# Patient Record
Sex: MALE | Race: WHITE
[De-identification: names, ages, dates, MRNs, and addresses within clinical notes are randomized per-mention and may not be internally consistent; named-entity substitution may affect disease eponyms.]

---

## 2022-11-16 ENCOUNTER — HOSPITAL ENCOUNTER (OUTPATIENT)
Dept: HOSPITAL 47 - RADNMMAIN | Age: 67
Discharge: HOME | End: 2022-11-16
Attending: FAMILY MEDICINE
Payer: MEDICARE

## 2022-11-16 DIAGNOSIS — I08.3: Primary | ICD-10-CM

## 2022-11-16 DIAGNOSIS — E78.5: ICD-10-CM

## 2022-11-16 PROCEDURE — 93306 TTE W/DOPPLER COMPLETE: CPT

## 2022-11-16 PROCEDURE — 93017 CV STRESS TEST TRACING ONLY: CPT

## 2022-11-16 NOTE — CA
Transthoracic Echo Report 

 Name: Zeke Giron 

 MRN:    [id number] 

 Age:    67     Gender:     M 

 

 :    1955 

 Exam Date:     2022 11:16 

 Exam Location: [facility] 

 Ht (in):     73     Wt (lb):     230 

 Ordering Physician:        Radha Leslie MD 

 Attending/Referring Phys:         Radha Gilbert  [facility] 

 Technician         Natalya Benton RDCS 

 Procedure CPT: 

 Indications:       R06.02 dyspnea 

 

 Cardiac Hx: 

 Technical Quality:      Fair 

 Contrast 1:                                Total Dose (mL): 

 Contrast 2:                                Total Dose (mL): 

 

 MEASUREMENTS  (Male / Female) Normal Values 

 2D ECHO 

 LV Diastolic Diameter PLAX        4.4 cm                4.2 - 5.9 / 3.9 - 5.3 cm 

 LV Systolic Diameter PLAX         2.7 cm                 

 IVS Diastolic Thickness           1.7 cm                0.6 - 1.0 / 0.6 - 0.9 cm 

 LVPW Diastolic Thickness          1.4 cm                0.6 - 1.0 / 0.6 - 0.9 cm 

 LV Relative Wall Thickness        0.7                    

 RV Internal Dim ED PLAX           2.5 cm                 

 LVOT Diameter                     2.7 cm                 

 LA Volume                         56.1 cm???              18 - 58 / 22 - 52 cm??? 

 

 M-MODE 

 Aortic Root Diameter MM           3.2 cm                 

 LA Systolic Diameter MM           4.2 cm                 

 LA Ao Ratio MM                    1.3                    

 

 DOPPLER 

 AV Peak Velocity                  110.5 cm/s             

 AV Peak Gradient                  4.9 mmHg               

 AV Mean Velocity                  85.5 cm/s              

 AV Mean Gradient                  3.1 mmHg               

 AV Velocity Time Integral         25.1 cm                

 LVOT Peak Velocity                106.5 cm/s             

 LVOT Peak Gradient                4.5 mmHg               

 LVOT Velocity Time Integral       21.9 cm                

 LVOT Stroke Volume                127.1 cm???              

 LVOT Stroke Volume Index          55.6 ml/m???             

 LVOT Cardiac Index                3743.5 cm???/min???m???      

 AV Area Cont Eq vti               5.1 cm???                

 AV Area Cont Eq pk                5.6 cm???                

 MV Peak Velocity                  64.9 cm/s              

 MV Peak Gradient                  1.7 mmHg               

 MV Mean Velocity                  40.5 cm/s              

 MV Mean Gradient                  0.8 mmHg               

 MV Velocity Time Integral         17.3 cm                

 MV Area PHT                       2.7 cm???                

 MR Peak Velocity                  147.8 cm/s             

 MR Peak Gradient                  8.7 mmHg               

 Mitral E Point Velocity           61.6 cm/s              

 Mitral A Point Velocity           57.0 cm/s              

 Mitral E to A Ratio               1.1                    

 MV Deceleration Time              277.1 ms               

 MV E' Velocity                    10.3 cm/s              

 Mitral E to MV E' Ratio           6.0                    

 TR Peak Velocity                  206.5 cm/s             

 TR Peak Gradient                  17.1 mmHg              

 Right Ventricular Systolic Press  21.2 mmHg              

 PV Peak Velocity                  98.0 cm/s              

 PV Peak Gradient                  3.8 mmHg               

 PI Peak Gradient                  14.9 mmHg              

 

 

 FINDINGS 

 Left Ventricle 

 Moderately increased left ventricular wall thickness. Normal Left ventricular  

 size,  systolic function with no obvious regional wall motion abnormalities.  

 Normal Left ventricular diastolic filling pattern. Left ventricular ejection  

 fraction is estimated at 55-60 %. 

 

 Right Ventricle 

 Normal right ventricular size and function. Right ventricular systolic pressure  

 within normal limits. 

 

 Right Atrium 

 Normal right atrial size. 

 

 Left Atrium 

 Normal left atrial size. 

 

 Mitral Valve 

 Structurally normal mitral valve. No mitral stenosis. Mild mitral  

 regurgitation. 

 

 Aortic Valve 

 Trileaflet aortic valve. No aortic valve stenosis, trace regurgitation. 

 

 Tricuspid Valve 

 Structurally normal tricuspid valve. Mild tricuspid regurgitation. 

 

 Pulmonic Valve 

 Trace pulmonic regurgitation. 

 

 Pericardium 

 No pericardial effusion. 

 

 Aorta 

 Normal size aortic root and proximal ascending aorta. 

 

 CONCLUSIONS 

 1.  Normal size and systolic function with moderate LVH 

 2.  Mild mitral and tricuspid regurgitation 

 3.  Trace aortic and pulmonic regurgitation 

 Previewed by:  

 Dr. Giuliana Bailey MD 

 (Electronically Signed) 

 Final Date:      2022 12:20

## 2022-11-16 NOTE — CA
Exercise Stress Test Report 

 

 Name:    Zeke Giron 

 

 MRN:    [id number] 

 

 Exam Date: 2022 11:03 

 

 Exam Location:      [facility]  

                     Stress 

 

 Ht (in):     73     Wt (lb):     230    BSA:    2.28 

 

 Ordering Phys:       Radha Leslie MD 

 

 Referring Phys:      Radha Gilbert 

 

 Technologist:        Richard Loza 

 

 Age:    67    Gender:    M 

 

 :    1955 

 Procedure CPT: 

 

 Indications:              R06.02 dyspnea 

 

 ICD-10 Codes: 

 

 Patient History: 

 

 Medications: 

 

 Meds past 24 hrs: 

 

 Pretest Chest Pain: 

 

 STRESS TEST      Dimitry 

 

 Protocol 

 

 

 

 

 Exercise Duration (min:sec):         08:22 

 Max ST Depressions (mm):         0 

 Angina Score:     0 

 Teresa Score:    8.37 

 Resting HR (bpm):      58 

 

 Peak HR (bpm):         142 

 

 Resting BP (mmHg):       105    /   71 

 

 Peak BP (mmHg):       154   /   78 

 

 MPHR:    155     Target HR:      132 

 

 % MPHR:     92 

 METS:  10.3 

 

 Total Dose: 

 Peak Dose: 

 Atropine: 

 Double Product:       [id number] 

 

 BP Response: 

 

 Stress Termination:       Reached target heart rate 

 

 Stress Symptoms: 

 SHORT OF BREATH 

 

 Stress Summary: 

 The patient's target heart rate was achieved, The hemodynamic  

 response to exercise was normal 

 

  ECG ANALYSIS 

 

 Resting ECG:     Sinus rhythm. Normal conduction. No arrhythmias.  

                  Normal repolarization. 

 

 Stress ECG:      No ECG evidence of ischemia with exercise. 

 

 CONCLUSIONS 

 Patient falls into low-risk group (DTS >= +5). This associates  

 the patient with an annual CV mortality <= 0.5%. 

 1.  Average exercise tolerance 

 2.  Normal EKG stress test with no evidence of stress-induced  

 ischemia 

 

 Dr. Giuliana Bailey MD 

 (Electronically Signed) 

 Final Date:      2022 12:28